# Patient Record
Sex: MALE | Race: WHITE | URBAN - METROPOLITAN AREA
[De-identification: names, ages, dates, MRNs, and addresses within clinical notes are randomized per-mention and may not be internally consistent; named-entity substitution may affect disease eponyms.]

---

## 2017-02-08 DIAGNOSIS — I49.9 IRREGULAR HEART BEAT: ICD-10-CM

## 2017-02-08 DIAGNOSIS — I10 HYPERTENSION GOAL BP (BLOOD PRESSURE) < 140/90: Primary | ICD-10-CM

## 2017-02-08 DIAGNOSIS — N04.9 NEPHROTIC SYNDROME: ICD-10-CM

## 2017-02-09 NOTE — TELEPHONE ENCOUNTER
Lisinopril 40mg      Last Written Prescription Date: 05/02/16  Last Fill Quantity: 90, # refills: 3    Last Office Visit with [facility], P or [facility] prescribing provider:  05/02/16   Future Office Visit:        BP Readings from Last 3 Encounters:   05/02/16 150/88   10/19/15 155/80   10/12/15 143/77       Thank You,  Belén Matos, Pharmacy Tech  [place]

## 2017-02-13 RX ORDER — LISINOPRIL 40 MG/1
TABLET ORAL
Qty: 90 TABLET | Refills: 3 | Status: SHIPPED | OUTPATIENT
Start: 2017-02-13 | End: 2017-10-09

## 2017-03-14 ENCOUNTER — TRANSFERRED RECORDS (OUTPATIENT)
Dept: HEALTH INFORMATION MANAGEMENT | Facility: CLINIC | Age: 67
End: 2017-03-14

## 2017-05-08 ENCOUNTER — OFFICE VISIT (OUTPATIENT)
Dept: ORTHOPEDICS | Facility: CLINIC | Age: 67
End: 2017-05-08
Payer: COMMERCIAL

## 2017-05-08 ENCOUNTER — RADIANT APPOINTMENT (OUTPATIENT)
Dept: GENERAL RADIOLOGY | Facility: CLINIC | Age: 67
End: 2017-05-08
Attending: ORTHOPAEDIC SURGERY
Payer: COMMERCIAL

## 2017-05-08 VITALS — HEIGHT: 69 IN | RESPIRATION RATE: 15 BRPM | BODY MASS INDEX: 46.65 KG/M2 | WEIGHT: 315 LBS

## 2017-05-08 DIAGNOSIS — M25.561 RIGHT KNEE PAIN, UNSPECIFIED CHRONICITY: ICD-10-CM

## 2017-05-08 DIAGNOSIS — Z96.652 STATUS POST TOTAL LEFT KNEE REPLACEMENT: Primary | ICD-10-CM

## 2017-05-08 DIAGNOSIS — Z96.652 STATUS POST TOTAL LEFT KNEE REPLACEMENT: ICD-10-CM

## 2017-05-08 DIAGNOSIS — M17.11 PRIMARY OSTEOARTHRITIS OF RIGHT KNEE: ICD-10-CM

## 2017-05-08 PROCEDURE — 73562 X-RAY EXAM OF KNEE 3: CPT | Mod: LT

## 2017-05-08 PROCEDURE — 20610 DRAIN/INJ JOINT/BURSA W/O US: CPT | Mod: RT | Performed by: ORTHOPAEDIC SURGERY

## 2017-05-08 PROCEDURE — 99213 OFFICE O/P EST LOW 20 MIN: CPT | Mod: 25 | Performed by: ORTHOPAEDIC SURGERY

## 2017-05-08 RX ORDER — METHYLPREDNISOLONE ACETATE 80 MG/ML
80 INJECTION, SUSPENSION INTRA-ARTICULAR; INTRALESIONAL; INTRAMUSCULAR; SOFT TISSUE ONCE
Qty: 1 ML | Refills: 0 | OUTPATIENT
Start: 2017-05-08 | End: 2017-05-08

## 2017-05-08 NOTE — MR AVS SNAPSHOT
After Visit Summary   5/8/2017    Uriel Flores    MRN: [id number]           Patient Information     Date Of Birth          1950        Visit Information        Provider Department      5/8/2017 2:00 PM Pierre Hi MD [facility]        Today's Diagnoses     Status post total left knee replacement    -  1    Right knee pain, unspecified chronicity        Primary osteoarthritis of right knee          Care Instructions    You have had a steroid injection today.  For the first 2 hours there will likely be some numbing in the joint from the lidocaine.  This is a good sign, indicating that the injection is in the right place.  In 2 hours the lidocaine will wear off, and the joint will hurt like you had a shot.  Each day the cortisone makes it feel better.  It reaches peak effect in 2 weeks.  We expect it to last for 3 months.  You may resume regular activity when you feel ready.  If you are diabetic, your glucoses will be quite high for several days.    Return to clinic 2-4 years for left knee x-ray.        Follow-ups after your visit        Who to contact     If you have questions or need follow up information about today's clinic visit or your schedule please contact [facility] directly at [phone].  Normal or non-critical lab and imaging results will be communicated to you by MyChart, letter or phone within 4 business days after the clinic has received the results. If you do not hear from us within 7 days, please contact the clinic through [employer] or phone. If you have a critical or abnormal lab result, we will notify you by phone as soon as possible.  Submit refill requests through [employer] or call your pharmacy and they will forward the refill request to us. Please allow 3 business days for your refill to be completed.          Additional Information About Your Visit        [employer] Information     [employer] lets you send messages to your doctor, view your test  "results, renew your prescriptions, schedule appointments and more. To sign up, go to [email or web address] . Click on \"Log in\" on the left side of the screen, which will take you to the Welcome page. Then click on \"Sign up Now\" on the right side of the page.     You will be asked to enter the access code listed below, as well as some personal information. Please follow the directions to create your username and password.     Your access code is: [email or web address]  Expires: 2017  3:05 PM     Your access code will  in 90 days. If you need help or a new code, please call your [facility] clinic or [phone].        Care EveryWhere ID     This is your Care EveryWhere ID. This could be used by other organizations to access your [facility] medical records  [id number]        Your Vitals Were     Respirations Height BMI (Body Mass Index)             15 1.753 m (5' 9\") 46.96 kg/m2          Blood Pressure from Last 3 Encounters:   16 150/88   10/19/15 155/80   10/12/15 143/77    Weight from Last 3 Encounters:   17 (!) 144.2 kg (318 lb)   16 (!) 140.6 kg (310 lb)   10/19/15 (!) 137 kg (302 lb)               Primary Care Provider Office Phone #    Francisco Piper [facility] [phone]       No address on file        Thank you!     Thank you for choosing [facility]  for your care. Our goal is always to provide you with excellent care. Hearing back from our patients is one way we can continue to improve our services. Please take a few minutes to complete the written survey that you may receive in the mail after your visit with us. Thank you!             Your Updated Medication List - Protect others around you: Learn how to safely use, store and throw away your medicines at www.disposemymeds.org.          This list is accurate as of: 17  3:05 PM.  Always use your most recent med list.                   Brand Name Dispense Instructions for use    albuterol (2.5 MG/3ML) 0.083% neb solution    "  360 mL    Take 1 vial (2.5 mg) by nebulization every 6 hours as needed for shortness of breath / dyspnea or wheezing       ALPRAZolam 0.5 MG tablet    XANAX    45 tablet    Take 1 tablet up to 2 times daily as needed.       atorvastatin 20 MG tablet    LIPITOR    90 tablet    Take 1 tablet (20 mg) by mouth daily       glucosamine-chondroitin 500-400 MG Caps per capsule      Take 1 capsule by mouth daily       lisinopril 40 MG tablet    PRINIVIL/ZESTRIL    90 tablet    Take 40 mg in AM, 20mg at bedtime       metolazone 2.5 MG tablet    ZAROXOLYN    30 tablet    Take 1 tablet (2.5 mg) by mouth as needed       order for DME     1 Device    Equipment being ordered: CPAP mask       torsemide 20 MG tablet    DEMADEX    180 tablet    Take 1 tablet (20 mg) by mouth 2 times daily       VITAMIN B-12 CR PO          Vitamin C 500 MG Caps

## 2017-05-08 NOTE — PROGRESS NOTES
Urile Flores is seen for follow up left total knee arthroplasty from 8/19/13.  He complains of right knee pain.  Also feels tight in left knee at times.  Has had significant swelling in legs with nephrotic syndrome diagnosed and followed by Nephrology.  Exercise:  walking, hunting.    Past Medical History:   Diagnosis Date     Ankle fracture, right     fall in his 40's. Did not heal well.     Asthma, intermittent     as a child     basal cell carcinoma 2008    L lower back     Basal cell carcinoma 7/02/2008    left lower back     BMI 40.0-44.9, adult (H)      Degenerative joint disease     Advanced-knees     Eczema      HTN (hypertension)      Hyperlipidemia LDL goal < 130      Insomnia      Lipoma of neck ~2005    3 cm  left posterior neck     Nephrotic syndrome 12/15/2014     COREEN (obstructive sleep apnea)      Spinal stenosis      Vitamin B12 deficiency        Past Surgical History:   Procedure Laterality Date     ARTHROPLASTY KNEE  8/19/2013    Procedure: ARTHROPLASTY KNEE;  left total knee arthroplasty;  Surgeon: Pierre Hi MD;  Location: [facility] OR     C TOTAL KNEE ARTHROPLASTY  8/19/13    Left     CYSTOSCOPY       HC KNEE SCOPE,SINGLE MENISECTOMY  4/13/2010    Rt knee scope- partial MM,LM (JUAN M Kaplan)     HC SACROPLASTY  1970's       Family History   Problem Relation Age of Onset     CANCER Mother      HEART DISEASE Mother      CANCER Father      HEART DISEASE Sister      CEREBROVASCULAR DISEASE Sister      DIABETES Sister      Asthma Daughter        Social History     Social History     Marital status:      Spouse name: N/A     Number of children: N/A     Years of education: N/A     Occupational History     Not on file.     Social History Main Topics     Smoking status: Former Smoker     Years: 7.00     Types: Cigarettes     Quit date: 3/1/1966     Smokeless tobacco: Never Used     Alcohol use Yes      Comment: rare;2-3 beers per year     Drug use: No     Sexual activity: Not Currently      Partners: Female     Other Topics Concern     Not on file     Social History Narrative       Current Outpatient Prescriptions   Medication Sig Dispense Refill     methylPREDNISolone acetate (DEPO-MEDROL) 80 MG/ML injection 1 mL (80 mg) by INTRA-ARTICULAR route once for 1 dose 1 mL 0     lisinopril (PRINIVIL/ZESTRIL) 40 MG tablet Take 40 mg in AM, 20mg at bedtime 90 tablet 3     atorvastatin (LIPITOR) 20 MG tablet Take 1 tablet (20 mg) by mouth daily 90 tablet 3     torsemide (DEMADEX) 20 MG tablet Take 1 tablet (20 mg) by mouth 2 times daily 180 tablet 3     order for DME Equipment being ordered: CPAP mask 1 Device 0     metolazone (ZAROXOLYN) 2.5 MG tablet Take 1 tablet (2.5 mg) by mouth as needed 30 tablet 2     ALPRAZolam (XANAX) 0.5 MG tablet Take 1 tablet up to 2 times daily as needed. 45 tablet 0     albuterol (2.5 MG/3ML) 0.083% nebulizer solution Take 1 vial (2.5 mg) by nebulization every 6 hours as needed for shortness of breath / dyspnea or wheezing 360 mL 3     Ascorbic Acid (VITAMIN C) 500 MG CAPS        Cyanocobalamin (VITAMIN B-12 CR PO)        glucosamine-chondroitin 500-400 MG CAPS Take 1 capsule by mouth daily         Allergies   Allergen Reactions     Fish Hives     Eating fish causes hives and throat closes.     No Known Drug Allergy        REVIEW OF SYSTEMS:  CONSTITUTIONAL:  NEGATIVE for fever, chills, change in weight, not feeling tired  SKIN:  NEGATIVE for worrisome rashes, no skin lumps, no skin ulcers and no non-healing wounds  EYES:  NEGATIVE for vision changes or irritation.  ENT/MOUTH:  NEGATIVE.  No hearing loss, no hoarseness, no difficulty swallowing.  RESP:  NEGATIVE. No cough or shortness of breath.  BREAST:  NEGATIVE for masses, tenderness or discharge  CV:  NEGATIVE for chest pain, palpitations or peripheral edema  GI:  NEGATIVE for nausea, abdominal pain, heartburn, or change in bowel habits  :  Negative. No dysuria, no hematuria  MUSCULOSKELETAL:  See HPI above  NEURO:   "NEGATIVE . No headaches, no dizziness,  no numbness  ENDOCRINE:  NEGATIVE for temperature intolerance, skin/hair changes  HEME/ALLERGY/IMMUNE:  NEGATIVE for bleeding problems  PSYCHIATRIC:  NEGATIVE. no anxiety, no depression.      Xray:  Xray images were independently visualized with the patient.  This shows good position of left total knee arthroplasty components.  No sign of loosening or wear. There is significant calcific deposits above patella.  Right knee shows severe osteoarthritis with medial and lateral wear.    Exam:  Vitals: Resp 15  Ht 1.753 m (5' 9\")  Wt (!) 144.2 kg (318 lb)  BMI 46.96 kg/m2  BMI= Body mass index is 46.96 kg/(m^2).  Range of motion right 0-120 degrees, left 0-120 degrees.  Alignment  Normal.  Stability:   Right       Lateral collateral ligament no laxity       Medial collateral ligament no laxity  Left:       Lateral collateral ligament no laxity       Medial collateral ligament no laxity  Wound:  Well healed.  Edema: Significant - both lower legs.  Effusion: moderate right knee, negative left knee.  Tenderness: Right Significant - medial joint line and lateral joint line.       Left:  None  Sensation, motor, and circulation intact.    Assessment:  left total knee arthroplasty doing well.  Right knee osteoarthritis.  Plan:  Resume activity as tolerated.  Return to clinic 2-4 years with xray left knee.  Continue antibiotics for dental work.  He  desires injection today of right knee(s).  Risks, benefits, potential complications and alternatives were discussed.   With the patient's consent, sterile prep was performed of right knee(s).  Right knee was injected with Depo Medrol 80 mg and lidocaine at anteromedial site.  Return to clinic as needed.      "

## 2017-05-08 NOTE — NURSING NOTE
"Chief Complaint   Patient presents with     RECHECK     Follow-up for right knee osteoarthritis and left total knee arthroplasty 8/19/13. Xrays to be taken today.       Initial Resp 15  Ht 1.753 m (5' 9\")  Wt (!) 144.2 kg (318 lb)  BMI 46.96 kg/m2 Estimated body mass index is 46.96 kg/(m^2) as calculated from the following:    Height as of this encounter: 1.753 m (5' 9\").    Weight as of this encounter: 144.2 kg (318 lb).  Medication Reconciliation: complete     Lew Luo PA-C  Supervising physician: Pierre Hi MD  Dept. of Orthopedics  [facility]          "

## 2017-05-08 NOTE — Clinical Note
5/8/2017       RE: Uriel Flores  [address] [id number]           Dear Colleague,    Thank you for referring your patient, Uriel Flores, to the [facility]. Please see a copy of my visit note below.    Uriel Flores is seen for follow up left total knee arthroplasty from 8/19/13.  He complains of right knee pain.  Also feels tight in left knee at times.  Has had significant swelling in legs with nephrotic syndrome diagnosed and followed by Nephrology.  Exercise:  walking, hunting.    Past Medical History:   Diagnosis Date     Ankle fracture, right     fall in his 40's. Did not heal well.     Asthma, intermittent     as a child     basal cell carcinoma 2008    L lower back     Basal cell carcinoma 7/02/2008    left lower back     BMI 40.0-44.9, adult (H)      Degenerative joint disease     Advanced-knees     Eczema      HTN (hypertension)      Hyperlipidemia LDL goal < 130      Insomnia      Lipoma of neck ~2005    3 cm  left posterior neck     Nephrotic syndrome 12/15/2014     COREEN (obstructive sleep apnea)      Spinal stenosis      Vitamin B12 deficiency        Past Surgical History:   Procedure Laterality Date     ARTHROPLASTY KNEE  8/19/2013    Procedure: ARTHROPLASTY KNEE;  left total knee arthroplasty;  Surgeon: Pierre Hi MD;  Location: [facility]     C TOTAL KNEE ARTHROPLASTY  8/19/13    Left     CYSTOSCOPY       HC KNEE SCOPE,SINGLE MENISECTOMY  4/13/2010    Rt knee scope- partial MM,LM (JUAN M Kaplan)     HC SACROPLASTY  1970's       Family History   Problem Relation Age of Onset     CANCER Mother      HEART DISEASE Mother      CANCER Father      HEART DISEASE Sister      CEREBROVASCULAR DISEASE Sister      DIABETES Sister      Asthma Daughter        Social History     Social History     Marital status:      Spouse name: N/A     Number of children: N/A     Years of education: N/A     Occupational History     Not on file.     Social History Main Topics     Smoking  status: Former Smoker     Years: 7.00     Types: Cigarettes     Quit date: 3/1/1966     Smokeless tobacco: Never Used     Alcohol use Yes      Comment: rare;2-3 beers per year     Drug use: No     Sexual activity: Not Currently     Partners: Female     Other Topics Concern     Not on file     Social History Narrative       Current Outpatient Prescriptions   Medication Sig Dispense Refill     methylPREDNISolone acetate (DEPO-MEDROL) 80 MG/ML injection 1 mL (80 mg) by INTRA-ARTICULAR route once for 1 dose 1 mL 0     lisinopril (PRINIVIL/ZESTRIL) 40 MG tablet Take 40 mg in AM, 20mg at bedtime 90 tablet 3     atorvastatin (LIPITOR) 20 MG tablet Take 1 tablet (20 mg) by mouth daily 90 tablet 3     torsemide (DEMADEX) 20 MG tablet Take 1 tablet (20 mg) by mouth 2 times daily 180 tablet 3     order for DME Equipment being ordered: CPAP mask 1 Device 0     metolazone (ZAROXOLYN) 2.5 MG tablet Take 1 tablet (2.5 mg) by mouth as needed 30 tablet 2     ALPRAZolam (XANAX) 0.5 MG tablet Take 1 tablet up to 2 times daily as needed. 45 tablet 0     albuterol (2.5 MG/3ML) 0.083% nebulizer solution Take 1 vial (2.5 mg) by nebulization every 6 hours as needed for shortness of breath / dyspnea or wheezing 360 mL 3     Ascorbic Acid (VITAMIN C) 500 MG CAPS        Cyanocobalamin (VITAMIN B-12 CR PO)        glucosamine-chondroitin 500-400 MG CAPS Take 1 capsule by mouth daily         Allergies   Allergen Reactions     Fish Hives     Eating fish causes hives and throat closes.     No Known Drug Allergy        REVIEW OF SYSTEMS:  CONSTITUTIONAL:  NEGATIVE for fever, chills, change in weight, not feeling tired  SKIN:  NEGATIVE for worrisome rashes, no skin lumps, no skin ulcers and no non-healing wounds  EYES:  NEGATIVE for vision changes or irritation.  ENT/MOUTH:  NEGATIVE.  No hearing loss, no hoarseness, no difficulty swallowing.  RESP:  NEGATIVE. No cough or shortness of breath.  BREAST:  NEGATIVE for masses, tenderness or  "discharge  CV:  NEGATIVE for chest pain, palpitations or peripheral edema  GI:  NEGATIVE for nausea, abdominal pain, heartburn, or change in bowel habits  :  Negative. No dysuria, no hematuria  MUSCULOSKELETAL:  See HPI above  NEURO:  NEGATIVE . No headaches, no dizziness,  no numbness  ENDOCRINE:  NEGATIVE for temperature intolerance, skin/hair changes  HEME/ALLERGY/IMMUNE:  NEGATIVE for bleeding problems  PSYCHIATRIC:  NEGATIVE. no anxiety, no depression.      Xray:  Xray images were independently visualized with the patient.  This shows good position of left total knee arthroplasty components.  No sign of loosening or wear. There is significant calcific deposits above patella.  Right knee shows severe osteoarthritis with medial and lateral wear.    Exam:  Vitals: Resp 15  Ht 1.753 m (5' 9\")  Wt (!) 144.2 kg (318 lb)  BMI 46.96 kg/m2  BMI= Body mass index is 46.96 kg/(m^2).  Range of motion right 0-120 degrees, left 0-120 degrees.  Alignment  Normal.  Stability:   Right       Lateral collateral ligament no laxity       Medial collateral ligament no laxity  Left:       Lateral collateral ligament no laxity       Medial collateral ligament no laxity  Wound:  Well healed.  Edema: Significant - both lower legs.  Effusion: moderate right knee, negative left knee.  Tenderness: Right Significant - medial joint line and lateral joint line.       Left:  None  Sensation, motor, and circulation intact.    Assessment:  left total knee arthroplasty doing well.  Right knee osteoarthritis.  Plan:  Resume activity as tolerated.  Return to clinic 2-4 years with xray left knee.  Continue antibiotics for dental work.  He  desires injection today of right knee(s).  Risks, benefits, potential complications and alternatives were discussed.   With the patient's consent, sterile prep was performed of right knee(s).  Right knee was injected with Depo Medrol 80 mg and lidocaine at anteromedial site.  Return to clinic as " needed.        The patient's right knee was prepped with hibiclens solution after verification of allergies. Area approximately 10 cm x 10 cm prepped in a sterile fashion. After injection, hibiclens removed with soap and water and band-aids applied.    1ml depo medrol with 1% lidocaine plain injected into patient's right knee by Dr. Pierre Hi  LOT# [id number]  Exp. 8/19      Again, thank you for allowing me to participate in the care of your patient.        Sincerely,              Pierre Hi MD

## 2017-05-24 DIAGNOSIS — E78.5 HYPERLIPIDEMIA LDL GOAL <100: ICD-10-CM

## 2017-05-24 NOTE — TELEPHONE ENCOUNTER
atorvastatin (LIPITOR) 20 MG        Last Written Prescription Date: 05/02/16  Last Fill Quantity: 90, # refills: 3    Last Office Visit with [facility], [facility] or [facility] prescribing provider:  05/02/16   Future Office Visit:      Cholesterol   Date Value Ref Range Status   11/17/2015 229 (H) <200 mg/dL Final     Comment:     LDL Cholesterol is the primary guide to therapy.   The NCEP recommends further evaluation of: patients with cholesterol greater   than 200 mg/dL if additional risk factors are present, cholesterol greater   than   240 mg/dL, triglycerides greater than 150 mg/dL, or HDL less than 40 mg/dL.       HDL Cholesterol   Date Value Ref Range Status   11/17/2015 48 >40 mg/dL Final     LDL Cholesterol Calculated   Date Value Ref Range Status   11/17/2015 146 (H) 0 - 129 mg/dL Final     Comment:     LDL Cholesterol is the primary guide to therapy: LDL-cholesterol goal in high   risk patients is <100 mg/dL and in very high risk patients is <70 mg/dL.       Triglycerides   Date Value Ref Range Status   11/17/2015 176 (H) 0 - 150 mg/dL Final     Comment:     Fasting specimen     Cholesterol/HDL Ratio   Date Value Ref Range Status   11/17/2015 4.8 0.0 - 5.0 Final     ALT   Date Value Ref Range Status   05/02/2016 47 0 - 70 U/L Final      Thank You,  Belén Matos, Pharmacy Tech  [place]/ [facility]

## 2017-05-26 RX ORDER — ATORVASTATIN CALCIUM 20 MG/1
20 TABLET, FILM COATED ORAL DAILY
Qty: 90 TABLET | Refills: 3 | Status: SHIPPED | OUTPATIENT
Start: 2017-05-26 | End: 2018-06-20

## 2017-07-17 ENCOUNTER — MEDICAL CORRESPONDENCE (OUTPATIENT)
Dept: HEALTH INFORMATION MANAGEMENT | Facility: CLINIC | Age: 67
End: 2017-07-17

## 2017-07-18 ENCOUNTER — TELEPHONE (OUTPATIENT)
Dept: FAMILY MEDICINE | Facility: CLINIC | Age: 67
End: 2017-07-18

## 2017-07-18 NOTE — TELEPHONE ENCOUNTER
Received from    Form(s) have been faxed.  Faxed or mailed to: number listed on form.  Was a copy sent to scanning?   yes sent to scanning on [facility]

## 2017-07-18 NOTE — TELEPHONE ENCOUNTER
Received form(s) from [facility] medical  - CPAP supplies  Placed form(s) in/on JS desk.  Forms need to be signed and faxed to number listed on form..    Call pt to verify form was sent: No   Copy needs to be sent for scanning after completion: Yes    Heather Jaquez CMA

## 2017-08-10 DIAGNOSIS — I10 HYPERTENSION GOAL BP (BLOOD PRESSURE) < 140/90: ICD-10-CM

## 2017-08-10 DIAGNOSIS — N04.9 NEPHROTIC SYNDROME: ICD-10-CM

## 2017-08-10 DIAGNOSIS — R60.1 GENERALIZED EDEMA: ICD-10-CM

## 2017-08-10 NOTE — TELEPHONE ENCOUNTER
Torsemide 20mg      Last Written Prescription Date: 5/2/16  Last Fill Quantity: 180, # refills: 3    Last Office Visit with [facility], P or [facility] prescribing provider:  05/02/16   Future Office Visit:        BP Readings from Last 3 Encounters:   05/02/16 150/88   10/19/15 155/80   10/12/15 143/77     Thank You,  Belén Matos, Pharmacy Tech  [place]/ [facility]

## 2017-08-11 RX ORDER — TORSEMIDE 20 MG/1
20 TABLET ORAL 2 TIMES DAILY
Qty: 180 TABLET | Refills: 3 | Status: SHIPPED | OUTPATIENT
Start: 2017-08-11

## 2017-08-14 ENCOUNTER — ALLIED HEALTH/NURSE VISIT (OUTPATIENT)
Dept: NURSING | Facility: CLINIC | Age: 67
End: 2017-08-14
Payer: COMMERCIAL

## 2017-08-14 DIAGNOSIS — H61.23 BILATERAL IMPACTED CERUMEN: Primary | ICD-10-CM

## 2017-08-14 PROCEDURE — 99207 ZZC NO CHARGE NURSE ONLY: CPT

## 2017-08-14 NOTE — MR AVS SNAPSHOT
"              After Visit Summary   2017    Uriel Flores    MRN: [id number]           Patient Information     Date Of Birth          1950        Visit Information        Provider Department      2017 10:45 AM BE ANCILLARY [facility]        Today's Diagnoses     Bilateral impacted cerumen    -  1       Follow-ups after your visit        Who to contact     If you have questions or need follow up information about today's clinic visit or your schedule please contact [facility] directly at [phone].  Normal or non-critical lab and imaging results will be communicated to you by MyChart, letter or phone within 4 business days after the clinic has received the results. If you do not hear from us within 7 days, please contact the clinic through [employer] or phone. If you have a critical or abnormal lab result, we will notify you by phone as soon as possible.  Submit refill requests through [employer] or call your pharmacy and they will forward the refill request to us. Please allow 3 business days for your refill to be completed.          Additional Information About Your Visit        MyChart Information     [employer] lets you send messages to your doctor, view your test results, renew your prescriptions, schedule appointments and more. To sign up, go to [email or web address] . Click on \"Log in\" on the left side of the screen, which will take you to the Welcome page. Then click on \"Sign up Now\" on the right side of the page.     You will be asked to enter the access code listed below, as well as some personal information. Please follow the directions to create your username and password.     Your access code is: [email or web address]  Expires: 2017 11:16 AM     Your access code will  in 90 days. If you need help or a new code, please call your [place] clinic or [phone].        Care EveryWhere ID     This is your Care EveryWhere ID. This could be used by other organizations to " access your [place] medical records  [id number]         Blood Pressure from Last 3 Encounters:   05/02/16 150/88   10/19/15 155/80   10/12/15 143/77    Weight from Last 3 Encounters:   05/08/17 (!) 318 lb (144.2 kg)   05/02/16 (!) 310 lb (140.6 kg)   10/19/15 (!) 302 lb (137 kg)              Today, you had the following     No orders found for display       Primary Care Provider Office Phone #    Francisco Piper [facility] [phone]       No address on file        Equal Access to Services     [facility] FarmPAMELLA : Hadii aad ku hadasho Soomaali, waaxda luqadaha, qaybta kaalmada melanie, kristina gaona . So [facility] [phone].    ATENCIÓN: Si habla español, tiene a mckenna disposición servicios gratuitos de asistencia lingüística. Ll[facility] [phone].    We comply with applicable federal civil rights laws and [place] laws. We do not discriminate on the basis of race, color, national origin, age, disability sex, sexual orientation or gender identity.            Thank you!     Thank you for choosing [facility]  for your care. Our goal is always to provide you with excellent care. Hearing back from our patients is one way we can continue to improve our services. Please take a few minutes to complete the written survey that you may receive in the mail after your visit with us. Thank you!             Your Updated Medication List - Protect others around you: Learn how to safely use, store and throw away your medicines at www.disposemymeds.org.          This list is accurate as of: 8/14/17 11:16 AM.  Always use your most recent med list.                   Brand Name Dispense Instructions for use Diagnosis    albuterol (2.5 MG/3ML) 0.083% neb solution     360 mL    Take 1 vial (2.5 mg) by nebulization every 6 hours as needed for shortness of breath / dyspnea or wheezing    Shortness of breath       ALPRAZolam 0.5 MG tablet    XANAX    45 tablet    Take 1 tablet up to 2 times daily as needed.     Tinnitus, bilateral       atorvastatin 20 MG tablet    LIPITOR    90 tablet    Take 1 tablet (20 mg) by mouth daily    Hyperlipidemia LDL goal <100       glucosamine-chondroitin 500-400 MG Caps per capsule      Take 1 capsule by mouth daily        lisinopril 40 MG tablet    PRINIVIL/ZESTRIL    90 tablet    Take 40 mg in AM, 20mg at bedtime    Hypertension goal BP (blood pressure) < 140/90, Irregular heart beat, Nephrotic syndrome       metolazone 2.5 MG tablet    ZAROXOLYN    30 tablet    Take 1 tablet (2.5 mg) by mouth as needed    Nephrotic syndrome, Irregular heart beat, Hypertension goal BP (blood pressure) < 140/90       order for DME     1 Device    Equipment being ordered: CPAP mask    COREEN (obstructive sleep apnea)       torsemide 20 MG tablet    DEMADEX    180 tablet    Take 1 tablet (20 mg) by mouth 2 times daily    Nephrotic syndrome, Hypertension goal BP (blood pressure) < 140/90, Generalized edema       VITAMIN B-12 CR PO           Vitamin C 500 MG Caps

## 2017-08-14 NOTE — PROGRESS NOTES
Patient presented to clinic for Bilateral ear wash. Both ear(s) were inspected with an otoscope and found to have cerumen in the ear canal(s). The patient has not been using OTC debrox for 0 days prior to today. The patient's ear(s) were washed out with a hydrogen peroxide/water mix. The patient did tolerate the procedure well. Slight dizziness

## 2017-10-09 DIAGNOSIS — I49.9 IRREGULAR HEART BEAT: ICD-10-CM

## 2017-10-09 DIAGNOSIS — N04.9 NEPHROTIC SYNDROME: ICD-10-CM

## 2017-10-09 DIAGNOSIS — I10 HYPERTENSION GOAL BP (BLOOD PRESSURE) < 140/90: ICD-10-CM

## 2017-10-09 NOTE — TELEPHONE ENCOUNTER
Lisinopril 40mg      Last Written Prescription Date: 02/13/17  Last Fill Quantity: 90, # refills: 3    Last Office Visit with G, P or [facility] prescribing provider:  05/0/16   Future Office Visit:        BP Readings from Last 3 Encounters:   05/02/16 150/88   10/19/15 155/80   10/12/15 143/77       Thank You,  Belén Matos, Pharmacy Tech  [employer]/ [facility]

## 2017-10-10 RX ORDER — LISINOPRIL 40 MG/1
TABLET ORAL
Qty: 90 TABLET | Refills: 3 | Status: SHIPPED | OUTPATIENT
Start: 2017-10-10 | End: 2018-06-20

## 2018-03-13 ENCOUNTER — OFFICE VISIT (OUTPATIENT)
Dept: FAMILY MEDICINE | Facility: CLINIC | Age: 68
End: 2018-03-13
Payer: COMMERCIAL

## 2018-03-13 VITALS
SYSTOLIC BLOOD PRESSURE: 132 MMHG | WEIGHT: 315 LBS | DIASTOLIC BLOOD PRESSURE: 84 MMHG | BODY MASS INDEX: 45.1 KG/M2 | HEIGHT: 70 IN | RESPIRATION RATE: 16 BRPM | TEMPERATURE: 97.2 F | OXYGEN SATURATION: 96 % | HEART RATE: 96 BPM

## 2018-03-13 DIAGNOSIS — Z91.81 AT RISK FOR FALLING: ICD-10-CM

## 2018-03-13 DIAGNOSIS — Z23 NEED FOR PROPHYLACTIC VACCINATION AGAINST STREPTOCOCCUS PNEUMONIAE (PNEUMOCOCCUS): ICD-10-CM

## 2018-03-13 DIAGNOSIS — R19.7 DIARRHEA, UNSPECIFIED TYPE: Primary | ICD-10-CM

## 2018-03-13 DIAGNOSIS — Z11.59 NEED FOR HEPATITIS C SCREENING TEST: ICD-10-CM

## 2018-03-13 LAB
ALBUMIN SERPL-MCNC: 3.6 G/DL (ref 3.4–5)
ALP SERPL-CCNC: 103 U/L (ref 40–150)
ALT SERPL W P-5'-P-CCNC: 58 U/L (ref 0–70)
ANION GAP SERPL CALCULATED.3IONS-SCNC: 7 MMOL/L (ref 3–14)
AST SERPL W P-5'-P-CCNC: 32 U/L (ref 0–45)
BASOPHILS # BLD AUTO: 0 10E9/L (ref 0–0.2)
BASOPHILS NFR BLD AUTO: 0.3 %
BILIRUB SERPL-MCNC: 0.5 MG/DL (ref 0.2–1.3)
BUN SERPL-MCNC: 12 MG/DL (ref 7–30)
C DIFF TOX B STL QL: NEGATIVE
CALCIUM SERPL-MCNC: 9.5 MG/DL (ref 8.5–10.1)
CHLORIDE SERPL-SCNC: 104 MMOL/L (ref 94–109)
CO2 SERPL-SCNC: 30 MMOL/L (ref 20–32)
CREAT SERPL-MCNC: 0.92 MG/DL (ref 0.66–1.25)
DIFFERENTIAL METHOD BLD: NORMAL
EOSINOPHIL # BLD AUTO: 0.2 10E9/L (ref 0–0.7)
EOSINOPHIL NFR BLD AUTO: 1.8 %
ERYTHROCYTE [DISTWIDTH] IN BLOOD BY AUTOMATED COUNT: 14.3 % (ref 10–15)
GFR SERPL CREATININE-BSD FRML MDRD: 82 ML/MIN/1.7M2
GLUCOSE SERPL-MCNC: 99 MG/DL (ref 70–99)
HCT VFR BLD AUTO: 47.5 % (ref 40–53)
HGB BLD-MCNC: 16 G/DL (ref 13.3–17.7)
LYMPHOCYTES # BLD AUTO: 2.4 10E9/L (ref 0.8–5.3)
LYMPHOCYTES NFR BLD AUTO: 24.2 %
MCH RBC QN AUTO: 30.5 PG (ref 26.5–33)
MCHC RBC AUTO-ENTMCNC: 33.7 G/DL (ref 31.5–36.5)
MCV RBC AUTO: 91 FL (ref 78–100)
MONOCYTES # BLD AUTO: 1.1 10E9/L (ref 0–1.3)
MONOCYTES NFR BLD AUTO: 11.3 %
NEUTROPHILS # BLD AUTO: 6.2 10E9/L (ref 1.6–8.3)
NEUTROPHILS NFR BLD AUTO: 62.4 %
PLATELET # BLD AUTO: 214 10E9/L (ref 150–450)
POTASSIUM SERPL-SCNC: 4.6 MMOL/L (ref 3.4–5.3)
PROT SERPL-MCNC: 7.7 G/DL (ref 6.8–8.8)
RBC # BLD AUTO: 5.24 10E12/L (ref 4.4–5.9)
SODIUM SERPL-SCNC: 141 MMOL/L (ref 133–144)
SPECIMEN SOURCE: NORMAL
WBC # BLD AUTO: 9.9 10E9/L (ref 4–11)

## 2018-03-13 PROCEDURE — 36415 COLL VENOUS BLD VENIPUNCTURE: CPT | Performed by: FAMILY MEDICINE

## 2018-03-13 PROCEDURE — 87493 C DIFF AMPLIFIED PROBE: CPT | Performed by: FAMILY MEDICINE

## 2018-03-13 PROCEDURE — 99213 OFFICE O/P EST LOW 20 MIN: CPT | Performed by: FAMILY MEDICINE

## 2018-03-13 PROCEDURE — 80053 COMPREHEN METABOLIC PANEL: CPT | Performed by: FAMILY MEDICINE

## 2018-03-13 PROCEDURE — 86803 HEPATITIS C AB TEST: CPT | Performed by: FAMILY MEDICINE

## 2018-03-13 PROCEDURE — 85025 COMPLETE CBC W/AUTO DIFF WBC: CPT | Performed by: FAMILY MEDICINE

## 2018-03-13 RX ORDER — LISINOPRIL AND HYDROCHLOROTHIAZIDE 20; 25 MG/1; MG/1
TABLET ORAL
COMMUNITY
End: 2018-03-13

## 2018-03-13 NOTE — PATIENT INSTRUCTIONS
[facility]    If you have any questions regarding to your visit please contact your care team:       Team Purple:   Clinic Hours Telephone Number   Dr. Komal Valdez   7am-7pm  Monday - Thursday   7am-5pm  Fridays  [phone]  (Appointment scheduling available 24/7)    Questions about your Visit?   Team Line:  [phone]   Urgent Care - [place] and [facility] - 11am-9pm Monday-Friday Saturday-Sunday- 9am-5pm   [place] - 5pm-9pm Monday-Friday Saturday-Sunday- 9am-5pm  [phone] - [facility]  [phone] - [place]       What options do I have for visits at the clinic other than the traditional office visit?  To expand how we care for you, many of our providers are utilizing electronic visits (e-visits) and telephone visits, when medically appropriate, for interactions with their patients rather than a visit in the clinic.   We also offer nurse visits for many medical concerns. Just like any other service, we will bill your insurance company for this type of visit based on time spent on the phone with your provider. Not all insurance companies cover these visits. Please check with your medical insurance if this type of visit is covered. You will be responsible for any charges that are not paid by your insurance.      E-visits via [employer]:  generally incur a $35.00 fee.  Telephone visits:  Time spent on the phone: *charged based on time that is spent on the phone in increments of 10 minutes. Estimated cost:   5-10 mins $30.00   11-20 mins. $59.00   21-30 mins. $85.00     Use [employer] (secure email communication and access to your chart) to send your primary care provider a message or make an appointment. Ask someone on your Team how to sign up for [employer].  For a Price Quote for your services, please call our Consumer Price Line at [phone].  As always, Thank you for trusting us with your health care  needs!    Meenakshi Spicer MA

## 2018-03-13 NOTE — NURSING NOTE
"Chief Complaint   Patient presents with     Diarrhea     Health Maintenance     Fall Risk, Pnuemococcal, FIT, Hep C Screening, ADP        Initial /84  Pulse 96  Temp 97.2  F (36.2  C) (Oral)  Resp 16  Ht 5' 9.69\" (1.77 m)  Wt (!) 316 lb 8 oz (143.6 kg)  SpO2 96%  BMI 45.82 kg/m2 Estimated body mass index is 45.82 kg/(m^2) as calculated from the following:    Height as of this encounter: 5' 9.69\" (1.77 m).    Weight as of this encounter: 316 lb 8 oz (143.6 kg).  Medication Reconciliation: complete   Viki LADD MA      "

## 2018-03-13 NOTE — PROGRESS NOTES
SUBJECTIVE:   Uriel Flores is a 67 year old male who presents to clinic today for the following health issues:    Started with diarrhea, later on lot of gas with small amounts of stool.  Tried a laxative    Usually once in the morning but has wetness,   Eating regularly   Stool normal color, no foul smell. Has a little blood with wiping.  Stomach growling.  Had colonoscopy last year and had polyps removed    Diarrhea  Onset: on and off for about 2 and a half weeks. Not completely stead     Description:   Consistency of stool: watery  Blood in stool: no   Number of loose stools in past 24 hours: 1    Progression of Symptoms:  same    Accompanying Signs & Symptoms:  Fever: no   Nausea or vomiting; no   Abdominal pain: no   Episodes of constipation: YES  Weight loss: no     History:   Ill contacts: no   Recent use of antibiotics: no    Recent travels: no          Recent medication-new or changes(Rx or OTC): YES- Patient states he tried a laxative about a week ago but it did not help     Precipitating factors:   none    Alleviating factors:   None     Therapies Tried and outcome:  Laxative ; Outcome: did not seem to help    Problem list and histories reviewed & adjusted, as indicated.  Additional history: as documented    Patient Active Problem List   Diagnosis     OA (osteoarthritis) of knee     Hypertension goal BP (blood pressure) < 140/90     Hyperlipidemia with target LDL less than 130     BMI 40.0-44.9, adult (H)     Ankle fracture, right     Degenerative joint disease     Advanced directives, counseling/discussion     History of skin cancer     Insomnia     COREEN (obstructive sleep apnea)     Lipoma of neck     Knee joint replacement - left     OA (osteoarthritis) of ankle     Instability of right subtalar joint     Peroneal neuropathy     Nephrotic syndrome     Membranous glomerulonephritis     Membranous nephropathy determined by biopsy     Dyspnea on exertion     Primary osteoarthritis of right knee     Past  "Surgical History:   Procedure Laterality Date     ARTHROPLASTY KNEE  8/19/2013    Procedure: ARTHROPLASTY KNEE;  left total knee arthroplasty;  Surgeon: Pierre Hi MD;  Location: [facility] OR     C TOTAL KNEE ARTHROPLASTY  8/19/13    Left     CYSTOSCOPY       HC KNEE SCOPE,SINGLE MENISECTOMY  4/13/2010    Rt knee scope- partial MM,LM (JUAN M Kaplan)     HC SACROPLASTY  1970's       Social History   Substance Use Topics     Smoking status: Former Smoker     Years: 7.00     Types: Cigarettes     Quit date: 3/1/1966     Smokeless tobacco: Never Used     Alcohol use Yes      Comment: rare;2-3 beers per year     Family History   Problem Relation Age of Onset     CANCER Mother      HEART DISEASE Mother      CANCER Father      HEART DISEASE Sister      CEREBROVASCULAR DISEASE Sister      DIABETES Sister      Asthma Daughter            Reviewed and updated as needed this visit by clinical staff    ROS:  Constitutional, HEENT, cardiovascular, pulmonary and gu systems are negative, except as otherwise noted.    OBJECTIVE:     /84  Pulse 96  Temp 97.2  F (36.2  C) (Oral)  Resp 16  Ht 5' 9.69\" (1.77 m)  Wt (!) 316 lb 8 oz (143.6 kg)  SpO2 96%  BMI 45.82 kg/m2  Body mass index is 45.82 kg/(m^2).  GENERAL: healthy, alert and no distress  NECK: no adenopathy and thyroid normal to palpation  RESP: lungs clear to auscultation - no rales, rhonchi or wheezes  CV: regular rate and rhythm, no murmur, click or rub, no peripheral edema   ABDOMEN: soft, obese, nontender, no masses and bowel sounds normal  MS: no gross musculoskeletal defects noted, no edema    Diagnostic Test Results:  none     ASSESSMENT/PLAN:     (R19.7) Diarrhea, unspecified type  (primary encounter diagnosis)  Comment: Differential: Gastroenteritis, Food Poisoning, C Diff. Will have initial work up.  Plan: Comprehensive metabolic panel, Clostridium         difficile toxin B PCR, CBC with platelets         differential    (Z68.42) BMI 45.0-49.9, adult " (H)  Comment: Counseled to make better food choices, exercise as tolerated, and lose weight.     (Z11.59) Need for hepatitis C screening test  Plan: Hepatitis C Screen Reflex to HCV RNA Quant and         Genotype          Suleiman Weber MD  [facility]

## 2018-03-13 NOTE — MR AVS SNAPSHOT
After Visit Summary   3/13/2018    Uriel Flores    MRN: [id number]           Patient Information     Date Of Birth          1950        Visit Information        Provider Department      3/13/2018 2:20 PM Suleiman Weber MD [facility]        Today's Diagnoses     Diarrhea, unspecified type    -  1    BMI 45.0-49.9, adult (H)        Need for hepatitis C screening test        At risk for falling        Need for prophylactic vaccination against Streptococcus pneumoniae (pneumococcus)          Care Instructions    [facility]    If you have any questions regarding to your visit please contact your care team:       Team Purple:   Clinic Hours Telephone Number   Dr. Komal Valdez   7am-7pm  Monday - Thursday   7am-5pm  Fridays  [phone]  (Appointment scheduling available 24/7)    Questions about your Visit?   Team Line:  [phone]   [facility] [place] [facility] - 11am-9pm Monday-Friday Saturday-Sunday- 9am-5pm   [place] - 5pm-9pm Monday-Friday Saturday-Sunday- 9am-5pm  [phone] - [place]   [phone] - [place]       What options do I have for visits at the clinic other than the traditional office visit?  To expand how we care for you, many of our providers are utilizing electronic visits (e-visits) and telephone visits, when medically appropriate, for interactions with their patients rather than a visit in the clinic.   We also offer nurse visits for many medical concerns. Just like any other service, we will bill your insurance company for this type of visit based on time spent on the phone with your provider. Not all insurance companies cover these visits. Please check with your medical insurance if this type of visit is covered. You will be responsible for any charges that are not paid by your insurance.      E-visits via [employer]:  generally incur a $35.00  "fee.  Telephone visits:  Time spent on the phone: *charged based on time that is spent on the phone in increments of 10 minutes. Estimated cost:   5-10 mins $30.00   11-20 mins. $59.00   21-30 mins. $85.00     Use [employer] (secure email communication and access to your chart) to send your primary care provider a message or make an appointment. Ask someone on your Team how to sign up for [email or web address].  For a Price Quote for your services, please call our [employer] Line at [phone].  As always, Thank you for trusting us with your health care needs!    Meenakshi Spicer MA            Follow-ups after your visit        Future tests that were ordered for you today     Open Future Orders        Priority Expected Expires Ordered    Clostridium difficile toxin B PCR Routine 3/20/2018 4/12/2018 3/13/2018            Who to contact     If you have questions or need follow up information about today's clinic visit or your schedule please contact [facility] directly at [phone].  Normal or non-critical lab and imaging results will be communicated to you by MyChart, letter or phone within 4 business days after the clinic has received the results. If you do not hear from us within 7 days, please contact the clinic through [employer] or phone. If you have a critical or abnormal lab result, we will notify you by phone as soon as possible.  Submit refill requests through [employer] or call your pharmacy and they will forward the refill request to us. Please allow 3 business days for your refill to be completed.          Additional Information About Your Visit        [employer] Information     [employer] lets you send messages to your doctor, view your test results, renew your prescriptions, schedule appointments and more. To sign up, go to [email or web address] . Click on \"Log in\" on the left side of the screen, which will take you to the Welcome page. Then click on \"Sign up Now\" on the right side of the page.     You will be " "asked to enter the access code listed below, as well as some personal information. Please follow the directions to create your username and password.     Your access code is: [email or web address]  Expires: 2018  3:10 PM     Your access code will  in 90 days. If you need help or a new code, please call your [place] clinic or [phone].        Care EveryWhere ID     This is your Care EveryWhere ID. This could be used by other organizations to access your [place] medical records  [id number]        Your Vitals Were     Pulse Temperature Respirations Height Pulse Oximetry BMI (Body Mass Index)    96 97.2  F (36.2  C) (Oral) 16 5' 9.69\" (1.77 m) 96% 45.82 kg/m2       Blood Pressure from Last 3 Encounters:   18 132/84   16 150/88   10/19/15 155/80    Weight from Last 3 Encounters:   18 (!) 316 lb 8 oz (143.6 kg)   17 (!) 318 lb (144.2 kg)   16 (!) 310 lb (140.6 kg)              We Performed the Following     CBC with platelets differential     Comprehensive metabolic panel     Hepatitis C Screen Reflex to HCV RNA Quant and Genotype        Primary Care Provider Office Phone # Fax #    [facility] [phone] [address] [facility] [address]        Equal Access to Services     VALENTE ECHOLS : Hadii aad ku hadasho Soomaali, waaxda luqadaha, qaybta kaalmada adeegyada, kristina sutton haydebra gaona . So [facility] [phone].    ATENCIÓN: Si habla español, tiene a mckenna disposición servicios gratuitos de asistencia lingüística. Llame al [phone].    We comply with applicable federal civil rights laws and [place] laws. We do not discriminate on the basis of race, color, national origin, age, disability, sex, sexual orientation, or gender identity.            Thank you!     Thank you for choosing [facility]  for your care. Our goal is always to provide you with excellent care. Hearing back from our patients is one way we can " continue to improve our services. Please take a few minutes to complete the written survey that you may receive in the mail after your visit with us. Thank you!             Your Updated Medication List - Protect others around you: Learn how to safely use, store and throw away your medicines at www.disposemymeds.org.          This list is accurate as of 3/13/18  3:10 PM.  Always use your most recent med list.                   Brand Name Dispense Instructions for use Diagnosis    atorvastatin 20 MG tablet    LIPITOR    90 tablet    Take 1 tablet (20 mg) by mouth daily    Hyperlipidemia LDL goal <100       lisinopril 40 MG tablet    PRINIVIL/ZESTRIL    90 tablet    Take 40 mg in AM, 20mg at bedtime    Hypertension goal BP (blood pressure) < 140/90, Irregular heart beat, Nephrotic syndrome       metolazone 2.5 MG tablet    ZAROXOLYN    30 tablet    Take 1 tablet (2.5 mg) by mouth as needed    Nephrotic syndrome, Irregular heart beat, Hypertension goal BP (blood pressure) < 140/90       order for DME     1 Device    Equipment being ordered: CPAP mask    COREEN (obstructive sleep apnea)       torsemide 20 MG tablet    DEMADEX    180 tablet    Take 1 tablet (20 mg) by mouth 2 times daily    Nephrotic syndrome, Hypertension goal BP (blood pressure) < 140/90, Generalized edema

## 2018-03-13 NOTE — LETTER
[address]  [place] [address]    March 20, 2018    Uriel Flores  [address] [facility] [address]          Dear Fernando Trevino is a copy of your results. Normal results.    Results for orders placed or performed in visit on 03/13/18   Hepatitis C Screen Reflex to HCV RNA Quant and Genotype   Result Value Ref Range    Hepatitis C Antibody Nonreactive NR^Nonreactive   Comprehensive metabolic panel   Result Value Ref Range    Sodium 141 133 - 144 mmol/L    Potassium 4.6 3.4 - 5.3 mmol/L    Chloride 104 94 - 109 mmol/L    Carbon Dioxide 30 20 - 32 mmol/L    Anion Gap 7 3 - 14 mmol/L    Glucose 99 70 - 99 mg/dL    Urea Nitrogen 12 7 - 30 mg/dL    Creatinine 0.92 0.66 - 1.25 mg/dL    GFR Estimate 82 >60 mL/min/1.7m2    GFR Estimate If Black >90 >60 mL/min/1.7m2    Calcium 9.5 8.5 - 10.1 mg/dL    Bilirubin Total 0.5 0.2 - 1.3 mg/dL    Albumin 3.6 3.4 - 5.0 g/dL    Protein Total 7.7 6.8 - 8.8 g/dL    Alkaline Phosphatase 103 40 - 150 U/L    ALT 58 0 - 70 U/L    AST 32 0 - 45 U/L   CBC with platelets differential   Result Value Ref Range    WBC 9.9 4.0 - 11.0 10e9/L    RBC Count 5.24 4.4 - 5.9 10e12/L    Hemoglobin 16.0 13.3 - 17.7 g/dL    Hematocrit 47.5 40.0 - 53.0 %    MCV 91 78 - 100 fl    MCH 30.5 26.5 - 33.0 pg    MCHC 33.7 31.5 - 36.5 g/dL    RDW 14.3 10.0 - 15.0 %    Platelet Count 214 150 - 450 10e9/L    Diff Method Automated Method     % Neutrophils 62.4 %    % Lymphocytes 24.2 %    % Monocytes 11.3 %    % Eosinophils 1.8 %    % Basophils 0.3 %    Absolute Neutrophil 6.2 1.6 - 8.3 10e9/L    Absolute Lymphocytes 2.4 0.8 - 5.3 10e9/L    Absolute Monocytes 1.1 0.0 - 1.3 10e9/L    Absolute Eosinophils 0.2 0.0 - 0.7 10e9/L    Absolute Basophils 0.0 0.0 - 0.2 10e9/L   Clostridium difficile toxin B PCR   Result Value Ref Range    Specimen Description Feces     C Diff Toxin B PCR Negative NEG^Negative       If  you have any questions or concerns, please me or my clinic team at [phone].      Sincerely,        Suleiman Weber MD/bt

## 2018-03-14 LAB — HCV AB SERPL QL IA: NONREACTIVE

## 2018-05-04 ENCOUNTER — OFFICE VISIT (OUTPATIENT)
Dept: ORTHOPEDICS | Facility: CLINIC | Age: 68
End: 2018-05-04
Payer: COMMERCIAL

## 2018-05-04 VITALS — WEIGHT: 307.8 LBS | DIASTOLIC BLOOD PRESSURE: 82 MMHG | BODY MASS INDEX: 44.56 KG/M2 | SYSTOLIC BLOOD PRESSURE: 186 MMHG

## 2018-05-04 DIAGNOSIS — S76.211A STRAIN OF ADDUCTOR MAGNUS MUSCLE OF RIGHT LOWER EXTREMITY, INITIAL ENCOUNTER: Primary | ICD-10-CM

## 2018-05-04 PROCEDURE — 99203 OFFICE O/P NEW LOW 30 MIN: CPT | Performed by: ORTHOPAEDIC SURGERY

## 2018-05-04 RX ORDER — METHOCARBAMOL 500 MG/1
1000 TABLET, FILM COATED ORAL 3 TIMES DAILY PRN
Qty: 30 TABLET | Refills: 1 | Status: SHIPPED | OUTPATIENT
Start: 2018-05-04

## 2018-05-04 ASSESSMENT — PAIN SCALES - GENERAL: PAINLEVEL: EXTREME PAIN (8)

## 2018-05-04 NOTE — PROGRESS NOTES
CHIEF COMPLAINT:   Chief Complaint   Patient presents with     Knee Pain     Right knee OA. Patient is seen by Dr. Hi. Last injection: with Sussy on 5/8/17. Patient states he is having a lot of pain in his upper leg area from the groin to the knee. It is very painful and stiff when he tries to get up from chairs or moves. Onset: mid 4/2018. Patient states he got stuck in a snow bank, his leg got twisted and he sat down on it. No treatments. Hx of pinched nerve in his back for years.        HISTORY OF PRESENT ILLNESS    Uriel Flores is a 67 year old male seen for evaluation of ongoing right thigh pain. Pain has been present since mid April 2018. He stepped into a snowbank and got stuck, twisted and fell. The patient was unable to get back up so he sat in that position until his son-in-law was able to shovel him out.  Pain starts in the groin and radiates down into the knee. Also has posterior hip/buttock pain.  He reports extreme pain and stiffness with getting up and down from sitting in chairs. Today his pain is rated an 8/10. Positive night pain. He has not had any treatments. Patient notes improvement of symptoms with getting up and moving around for a bit. Followed by Dr. Pierre Hi for bilateral knee osteoarthritis, prior left total knee arthroplasty.    History of radiculopathy in low back, chronic pinched nerves.    Present symptoms:  pain, stiffness.    Pain severity: 8/10  Frequency of symptoms: frequently  Exacerbating Factors: weight bearing  Relieving Factors: rest  Night Pain: No  Pain while at rest: No   Numbness or tingling: No   Patient has tried:     NSAIDS: No      Physical Therapy: No      Activity modification: Yes      Bracing: No      Injections: No     Ice: No      Assistive device:  Yes - cane     Other:     Orthopaedic PMH: known knee osteoarthritis, known back osteoarthritis     Other PMH:  has a past medical history of Ankle fracture, right; Asthma, intermittent; basal cell  carcinoma (2008); Basal cell carcinoma (7/02/2008); BMI 40.0-44.9, adult (H); Degenerative joint disease; Eczema; HTN (hypertension); Hyperlipidemia LDL goal < 130; Insomnia; Lipoma of neck (~2005); Nephrotic syndrome (12/15/2014); COREEN (obstructive sleep apnea); Spinal stenosis; and Vitamin B12 deficiency. He also has no past medical history of Bleeding disorder (H); Heart disease; Inflammatory arthritis; Stroke (H); Surgical complications; or Type II or unspecified type diabetes mellitus without mention of complication, not stated as uncontrolled.  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 6/10/2011          Ankle fracture, right      Priority: Medium     fall in his 40's. Did not heal well.       Degenerative joint disease      Priority: Medium     Advanced-knees        Hyperlipidemia with target LDL less than 130      Priority: Medium     Diagnosis updated by automated process. Provider to review and confirm.       BMI 40.0-44.9, adult (H)      Priority: Medium     Hypertension goal BP (blood pressure) < 140/90 12/21/2010     Priority: Medium     OA (osteoarthritis) of knee 07/28/2010     Priority: Medium       Surgical Hx:  has a past surgical history that includes cystoscopy; KNEE SCOPE,MED-LAT MENISECTOMY (4/13/2010); SACROPLASTY (1970's); TOTAL KNEE ARTHROPLASTY (8/19/13); and Arthroplasty knee (8/19/2013).    Medications:   Current Outpatient Prescriptions:      ACETAMINOPHEN PO, Take 650 mg by mouth, Disp: , Rfl:      atorvastatin (LIPITOR) 20 MG tablet, Take 1 tablet (20 mg) by mouth daily, Disp: 90 tablet, Rfl: 3     lisinopril (PRINIVIL/ZESTRIL) 40 MG tablet, Take 40 mg in AM, 20mg at bedtime, Disp: 90 tablet, Rfl: 3     metolazone (ZAROXOLYN) 2.5 MG tablet, Take 1 tablet (2.5 mg) by mouth as needed, Disp: 30 tablet, Rfl: 2     order for DME, Equipment being ordered: CPAP mask, Disp: 1 Device, Rfl: 0     torsemide (DEMADEX) 20 MG tablet, Take 1 tablet (20 mg) by mouth 2 times daily, Disp: 180 tablet, Rfl: 3    Allergies:   Allergies   Allergen Reactions     Fish Hives     Eating fish causes hives and throat closes.     No Known Drug Allergy        Social Hx: retired   reports that he quit smoking about 52 years ago. His smoking use included Cigarettes. He quit after 7.00 years of use. He has never used smokeless tobacco. He reports that he drinks alcohol. He reports that he does not use illicit drugs.    Family Hx: family history includes Asthma in his daughter; CANCER in his father and mother; CEREBROVASCULAR DISEASE in his sister; DIABETES in his sister; HEART DISEASE in his mother and sister.    REVIEW OF SYSTEMS: 10 point ROS neg other than the symptoms noted above in the HPI and PMH. Notables include  CONSTITUTIONAL:NEGATIVE for fever, chills, change in  "weight  INTEGUMENTARY/SKIN: NEGATIVE for worrisome rashes, moles or lesions  MUSCULOSKELETAL:See HPI above  NEURO: NEGATIVE for weakness, dizziness or paresthesias    This document serves as a record of the services and decisions personally performed and made by Conor Luong MD. It was created on his behalf by Shyla Mcdonough, a trained medical scribe. The creation of this document is based the provider's statements to the medical scribe.    Scribe Shyla Mcdonough 9:33 AM 5/4/2018    PHYSICAL EXAM:  /82 (BP Location: Right arm)  Ht (P) 5' 9\" (1.753 m)  Wt 307 lb 12.8 oz (139.6 kg)  BMI (P) 45.45 kg/m2   GENERAL APPEARANCE: healthy, alert, no distress; obese habitus  SKIN: no suspicious lesions or rashes  NEURO: Normal strength and tone, mentation intact and speech normal  PSYCH:  mentation appears normal and affect normal, not anxious  RESPIRATORY: No increased work of breathing.  HANDS: no clubbing, nail pitting.    BILATERAL LOWER EXTREMITIES:  Gait: using cane for support  No gross deformities or masses.  No Quad atrophy, strength normal.  Intact sensation deep peroneal nerve, superficial peroneal nerve, med/lat tibial nerve, sural nerve, saphenous nerve  Intact EHL, EDL, TA, FHL, GS, quadriceps hamstrings and hip flexors  Toes warm and well perfused, brisk capillary refill. Palpable 2+ dp pulses.  Bilateral calf soft and nttp or squeeze.  No palpable popliteal lymphadenopathy.  DTRs: achilles 2+, patella 2+.  Edema: significant bilateral, pitting lymphedema  Severe right pes planus, pronation; less severe left foot pes planus.    RIGHT lower extremity exam:    Palpation: Tender: right groin/ adductors, hamstrings, rectus, diffuse about the knee.  Strength:  Intact, weak  Special tests:  Irritability (flexion/adduction/internal rotation) negative  Hip range of motion: within normal limits, no discomfort of hip. Negative logrolling.      X-rays: none.    Impression:   67 year old male with right thigh/groin pain, " "adductor strain.    Plan:     * discussed symptoms consistent with adductor strain, pulled \"groin\" muscle.   * recommended stretching and physical therapy  * ice/heat/stretching/activity modification  * will try robaxin to help calm muscles.   * return to clinic as needed.    Patient to follow up with Primary Care provider regarding elevated blood pressure.      The information in this document, created by a scribe for me, accurately reflects the services I personally performed and the decisions made by me. I have reviewed and approved this document for accuracy.     Conor Luong M.D., M.S.  Dept. of Orthopaedic Surgery  [facility]      "

## 2018-05-04 NOTE — LETTER
5/4/2018         RE: Uriel Flores  [address] [id number]        Dear Colleague,    Thank you for referring your patient, Uriel Flores, to the [facility]. Please see a copy of my visit note below.    CHIEF COMPLAINT:   Chief Complaint   Patient presents with     Knee Pain     Right knee OA. Patient is seen by Dr. Hi. Last injection: with Sussy on 5/8/17. Patient states he is having a lot of pain in his upper leg area from the groin to the knee. It is very painful and stiff when he tries to get up from chairs or moves. Onset: mid 4/2018. Patient states he got stuck in a snow bank, his leg got twisted and he sat down on it. No treatments. Hx of pinched nerve in his back for years.        HISTORY OF PRESENT ILLNESS    Uriel Flores is a 67 year old male seen for evaluation of ongoing right thigh pain. Pain has been present since mid April 2018. He stepped into a snowbank and got stuck, twisted and fell. The patient was unable to get back up so he sat in that position until his son-in-law was able to shovel him out.  Pain starts in the groin and radiates down into the knee. Also has posterior hip/buttock pain.  He reports extreme pain and stiffness with getting up and down from sitting in chairs. Today his pain is rated an 8/10. Positive night pain. He has not had any treatments. Patient notes improvement of symptoms with getting up and moving around for a bit. Followed by Dr. Pierre Hi for bilateral knee osteoarthritis, prior left total knee arthroplasty.    History of radiculopathy in low back, chronic pinched nerves.    Present symptoms:  pain, stiffness.    Pain severity: 8/10  Frequency of symptoms: frequently  Exacerbating Factors: weight bearing  Relieving Factors: rest  Night Pain: No  Pain while at rest: No   Numbness or tingling: No   Patient has tried:     NSAIDS: No      Physical Therapy: No      Activity modification: Yes      Bracing: No       Injections: No     Ice: No      Assistive device:  Yes - cane     Other:     Orthopaedic PMH: known knee osteoarthritis, known back osteoarthritis     Other PMH:  has a past medical history of Ankle fracture, right; Asthma, intermittent; basal cell carcinoma (2008); Basal cell carcinoma (7/02/2008); BMI 40.0-44.9, adult (H); Degenerative joint disease; Eczema; HTN (hypertension); Hyperlipidemia LDL goal < 130; Insomnia; Lipoma of neck (~2005); Nephrotic syndrome (12/15/2014); COREEN (obstructive sleep apnea); Spinal stenosis; and Vitamin B12 deficiency. He also has no past medical history of Bleeding disorder (H); Heart disease; Inflammatory arthritis; Stroke (H); Surgical complications; or Type II or unspecified type diabetes mellitus without mention of complication, not stated as uncontrolled.  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     Priority: Medium     Knee joint replacement - left 08/16/2013     Priority: Medium     Lipoma of neck      Priority: Medium     COREEN (obstructive sleep apnea)      Priority: Medium     Insomnia      Priority: Medium     History of skin cancer 07/27/2011     Priority: Medium     Advanced directives, counseling/discussion 06/10/2011     Priority: Medium     Advance Directive Problem List Overview:   Name Relationship Phone    Primary Health Care Agent            Alternative Health Care Agent          Patient  [facility] has Advance Directive and will bring in a copy to clinic. 6/10/2011          Ankle fracture, right      Priority: Medium     fall in his 40's. Did not heal well.       Degenerative joint disease      Priority: Medium     Advanced-knees       Hyperlipidemia with target LDL less than 130      Priority: Medium     Diagnosis updated by automated process. Provider to review and confirm.       BMI 40.0-44.9, adult (H)      Priority: Medium     Hypertension goal BP (blood pressure) < 140/90 12/21/2010     Priority: Medium     OA (osteoarthritis) of knee 07/28/2010     Priority: Medium       Surgical Hx:  has a past surgical history that includes cystoscopy; KNEE SCOPE,MED-LAT MENISECTOMY (4/13/2010); SACROPLASTY (1970's); TOTAL KNEE ARTHROPLASTY (8/19/13); and Arthroplasty knee (8/19/2013).    Medications:   Current Outpatient Prescriptions:      ACETAMINOPHEN PO, Take 650 mg by mouth, Disp: , Rfl:      atorvastatin (LIPITOR) 20 MG tablet, Take 1 tablet (20 mg) by mouth daily, Disp: 90 tablet, Rfl: 3     lisinopril (PRINIVIL/ZESTRIL) 40 MG tablet, Take 40 mg in AM, 20mg at bedtime, Disp: 90 tablet, Rfl: 3     metolazone (ZAROXOLYN) 2.5 MG tablet, Take 1 tablet (2.5 mg) by mouth as needed, Disp: 30 tablet, Rfl: 2     order for DME, Equipment being ordered: CPAP mask, Disp: 1 Device, Rfl: 0     torsemide (DEMADEX) 20 MG tablet, Take 1 tablet (20 mg) by mouth 2 times daily, Disp: 180 tablet, Rfl: 3    Allergies:   Allergies   Allergen Reactions     Fish Hives     Eating fish causes hives and throat closes.     No Known Drug Allergy        Social Hx: retired   reports that he quit smoking about 52 years ago. His smoking use included Cigarettes. He quit after 7.00 years of use. He has never used smokeless tobacco. He reports that he drinks alcohol. He reports that he does not use illicit drugs.    Family Hx: family history includes Asthma in his daughter; CANCER in his father and mother; CEREBROVASCULAR DISEASE in his  "sister; DIABETES in his sister; HEART DISEASE in his mother and sister.    REVIEW OF SYSTEMS: 10 point ROS neg other than the symptoms noted above in the HPI and PMH. Notables include  CONSTITUTIONAL:NEGATIVE for fever, chills, change in weight  INTEGUMENTARY/SKIN: NEGATIVE for worrisome rashes, moles or lesions  MUSCULOSKELETAL:See HPI above  NEURO: NEGATIVE for weakness, dizziness or paresthesias    This document serves as a record of the services and decisions personally performed and made by Conor Luong MD. It was created on his behalf by Shyla Mcdonough, a trained medical scribe. The creation of this document is based the provider's statements to the medical scribe.    Scribe Shyla Mcdonough 9:33 AM 5/4/2018    PHYSICAL EXAM:  /82 (BP Location: Right arm)  Ht (P) 5' 9\" (1.753 m)  Wt 307 lb 12.8 oz (139.6 kg)  BMI (P) 45.45 kg/m2   GENERAL APPEARANCE: healthy, alert, no distress; obese habitus  SKIN: no suspicious lesions or rashes  NEURO: Normal strength and tone, mentation intact and speech normal  PSYCH:  mentation appears normal and affect normal, not anxious  RESPIRATORY: No increased work of breathing.  HANDS: no clubbing, nail pitting.    BILATERAL LOWER EXTREMITIES:  Gait: using cane for support  No gross deformities or masses.  No Quad atrophy, strength normal.  Intact sensation deep peroneal nerve, superficial peroneal nerve, med/lat tibial nerve, sural nerve, saphenous nerve  Intact EHL, EDL, TA, FHL, GS, quadriceps hamstrings and hip flexors  Toes warm and well perfused, brisk capillary refill. Palpable 2+ dp pulses.  Bilateral calf soft and nttp or squeeze.  No palpable popliteal lymphadenopathy.  DTRs: achilles 2+, patella 2+.  Edema: significant bilateral, pitting lymphedema  Severe right pes planus, pronation; less severe left foot pes planus.    RIGHT lower extremity exam:    Palpation: Tender: right groin/ adductors, hamstrings, rectus, diffuse about the knee.  Strength:  Intact, " "weak  Special tests:  Irritability (flexion/adduction/internal rotation) negative  Hip range of motion: within normal limits, no discomfort of hip. Negative logrolling.      X-rays: none.    Impression:   67 year old male with right thigh/groin pain, adductor strain.    Plan:     * discussed symptoms consistent with adductor strain, pulled \"groin\" muscle.   * recommended stretching and physical therapy  * ice/heat/stretching/activity modification  * will try robaxin to help calm muscles.   * return to clinic as needed.    Patient to follow up with Primary Care provider regarding elevated blood pressure.      The information in this document, created by a scribe for me, accurately reflects the services I personally performed and the decisions made by me. I have reviewed and approved this document for accuracy.     Conor Luong M.D., M.S.  Dept. of Orthopaedic Surgery  [facility]        Again, thank you for allowing me to participate in the care of your patient.        Sincerely,        Conor Luong MD    "

## 2018-06-20 DIAGNOSIS — N04.9 NEPHROTIC SYNDROME: ICD-10-CM

## 2018-06-20 DIAGNOSIS — E78.5 HYPERLIPIDEMIA LDL GOAL <100: ICD-10-CM

## 2018-06-20 DIAGNOSIS — I49.9 IRREGULAR HEART BEAT: ICD-10-CM

## 2018-06-20 DIAGNOSIS — I10 HYPERTENSION GOAL BP (BLOOD PRESSURE) < 140/90: ICD-10-CM

## 2018-06-20 NOTE — TELEPHONE ENCOUNTER
Lisinopril  Last Written Prescription Date:  10/10/17  Last Fill Quantity: 90,   # refills: 3  Last Office Visit: 05/02/16  Future Office visit:         Atorvastatin           Last Written Prescription Date:  05/26/17  Last Fill Quantity: 90,   # refills: 3  Last Office Visit: 05/02/16  Future Office visit:       Thank You,  Belén Matos, Pharmacy Tech  [facility]

## 2018-06-26 RX ORDER — ATORVASTATIN CALCIUM 20 MG/1
20 TABLET, FILM COATED ORAL DAILY
Qty: 90 TABLET | Refills: 3 | Status: SHIPPED | OUTPATIENT
Start: 2018-06-26

## 2018-06-26 RX ORDER — LISINOPRIL 40 MG/1
TABLET ORAL
Qty: 90 TABLET | Refills: 3 | Status: SHIPPED | OUTPATIENT
Start: 2018-06-26 | End: 2019-03-02

## 2019-03-02 DIAGNOSIS — I10 HYPERTENSION GOAL BP (BLOOD PRESSURE) < 140/90: ICD-10-CM

## 2019-03-02 DIAGNOSIS — I49.9 IRREGULAR HEART BEAT: ICD-10-CM

## 2019-03-02 DIAGNOSIS — N04.9 NEPHROTIC SYNDROME: ICD-10-CM

## 2019-03-02 RX ORDER — LISINOPRIL 40 MG/1
TABLET ORAL
Qty: 90 TABLET | Refills: 0 | Status: SHIPPED | OUTPATIENT
Start: 2019-03-02

## 2019-03-02 NOTE — TELEPHONE ENCOUNTER
Patient is overdue for follow up. Needs to schedule an appointment prior to future refills.    Michelle Mckeon RN
